# Patient Record
Sex: MALE | NOT HISPANIC OR LATINO
[De-identification: names, ages, dates, MRNs, and addresses within clinical notes are randomized per-mention and may not be internally consistent; named-entity substitution may affect disease eponyms.]

---

## 2020-05-12 ENCOUNTER — FORM ENCOUNTER (OUTPATIENT)
Age: 60
End: 2020-05-12

## 2022-10-25 DIAGNOSIS — Z78.9 OTHER SPECIFIED HEALTH STATUS: ICD-10-CM

## 2022-10-25 DIAGNOSIS — M77.42 METATARSALGIA, LEFT FOOT: ICD-10-CM

## 2022-10-25 DIAGNOSIS — M72.2 PLANTAR FASCIAL FIBROMATOSIS: ICD-10-CM

## 2022-10-25 DIAGNOSIS — S93.492D SPRAIN OF OTHER LIGAMENT OF LEFT ANKLE, SUBSEQUENT ENCOUNTER: ICD-10-CM

## 2022-10-25 DIAGNOSIS — Z86.39 PERSONAL HISTORY OF OTHER ENDOCRINE, NUTRITIONAL AND METABOLIC DISEASE: ICD-10-CM

## 2022-10-25 DIAGNOSIS — M76.72 PERONEAL TENDINITIS, LEFT LEG: ICD-10-CM

## 2022-10-25 DIAGNOSIS — M77.41 METATARSALGIA, RIGHT FOOT: ICD-10-CM

## 2022-10-25 DIAGNOSIS — Z87.891 PERSONAL HISTORY OF NICOTINE DEPENDENCE: ICD-10-CM

## 2022-10-25 DIAGNOSIS — M20.21 HALLUX RIGIDUS, RIGHT FOOT: ICD-10-CM

## 2022-10-25 DIAGNOSIS — M20.22 HALLUX RIGIDUS, LEFT FOOT: ICD-10-CM

## 2022-10-25 DIAGNOSIS — M20.5X2 OTHER DEFORMITIES OF TOE(S) (ACQUIRED), LEFT FOOT: ICD-10-CM

## 2022-10-25 RX ORDER — INSULIN LISPRO 100 [IU]/ML
100 INJECTION, SOLUTION INTRAVENOUS; SUBCUTANEOUS
Qty: 140 | Refills: 0 | Status: ACTIVE | COMMUNITY
Start: 2022-05-09

## 2022-10-25 RX ORDER — ROSUVASTATIN CALCIUM 40 MG/1
40 TABLET, FILM COATED ORAL
Qty: 90 | Refills: 0 | Status: ACTIVE | COMMUNITY
Start: 2022-03-23

## 2022-10-25 RX ORDER — ESOMEPRAZOLE MAGNESIUM 20 MG/1
20 CAPSULE, DELAYED RELEASE ORAL
Refills: 0 | Status: ACTIVE | COMMUNITY

## 2022-10-26 ENCOUNTER — APPOINTMENT (OUTPATIENT)
Dept: PODIATRY | Facility: CLINIC | Age: 62
End: 2022-10-26

## 2022-10-26 VITALS — BODY MASS INDEX: 28.14 KG/M2 | HEIGHT: 69 IN | WEIGHT: 190 LBS

## 2022-10-26 PROCEDURE — 99212 OFFICE O/P EST SF 10 MIN: CPT

## 2022-10-26 RX ORDER — BLOOD-GLUCOSE SENSOR
EACH MISCELLANEOUS
Qty: 9 | Refills: 0 | Status: ACTIVE | COMMUNITY
Start: 2022-08-18

## 2022-10-26 RX ORDER — LINACLOTIDE 145 UG/1
145 CAPSULE, GELATIN COATED ORAL
Qty: 90 | Refills: 0 | Status: ACTIVE | COMMUNITY
Start: 2021-09-13

## 2022-10-26 RX ORDER — PANTOPRAZOLE 40 MG/1
40 TABLET, DELAYED RELEASE ORAL
Qty: 90 | Refills: 0 | Status: ACTIVE | COMMUNITY
Start: 2021-12-07

## 2022-10-26 RX ORDER — DICLOFENAC SODIUM 1% 10 MG/G
1 GEL TOPICAL
Refills: 0 | Status: DISCONTINUED | COMMUNITY
End: 2022-10-26

## 2022-10-28 NOTE — ASSESSMENT
[FreeTextEntry1] : \par Treatment: I trimmed keratotic lesion. I want him to use Aquaphor before exercising. I want him to continue Spenco orthotics and follow-up as needed.

## 2022-10-28 NOTE — HISTORY OF PRESENT ILLNESS
[Sneakers] : jani [FreeTextEntry1] : Patient presents today for high risk footcare.  He has hallux limitus and pinched callus at the hallux. Patient is an insulin dependent diabetic. Fasting blood sugar is 84. Last A1c was 7.3.\par \par

## 2022-10-28 NOTE — PHYSICAL EXAM
[Varicose Veins Of Lower Extremities] : present [0] : left foot posterior tibialis 0 [2+] : left foot dorsalis pedis 2+ [Vibration Dec.] : diminished vibratory sensation at the level of the toes [Position Sense Dec.] : diminished position sense at the level of the toes [Diminished Throughout Right Foot] : diminished sensation with monofilament testing throughout right foot [Diminished Throughout Left Foot] : diminished sensation with monofilament testing throughout left foot [de-identified] : Functional hallux limitus with flatfoot deformity. [FreeTextEntry1] : Pinched callus at the hallux bilateral.

## 2023-01-25 ENCOUNTER — APPOINTMENT (OUTPATIENT)
Dept: PODIATRY | Facility: CLINIC | Age: 63
End: 2023-01-25
Payer: COMMERCIAL

## 2023-01-25 DIAGNOSIS — M79.673 PAIN IN UNSPECIFIED FOOT: ICD-10-CM

## 2023-01-25 PROCEDURE — 99212 OFFICE O/P EST SF 10 MIN: CPT

## 2023-01-27 PROBLEM — M79.673 PAIN, FOOT: Status: ACTIVE | Noted: 2023-01-26

## 2023-01-27 NOTE — HISTORY OF PRESENT ILLNESS
[Sneakers] : jani [FreeTextEntry1] : Patient presents today evaluation of peripheral neuropathy from a flatfoot. He has functional hallux limitus and tailor's bunion that he feels is slowly getting worse.\par

## 2023-01-27 NOTE — ASSESSMENT
[FreeTextEntry1] : \par Impression: Bony's bunion. Pain. Diabetes with neuropathy. \par \par Treatment: He will continue Spenco inserts and arch support. Continue stretching and home exercises. Follow-up in the office with any pain that persists.\par

## 2023-01-27 NOTE — PHYSICAL EXAM
[Varicose Veins Of Lower Extremities] : present [0] : left foot posterior tibialis 0 [2+] : left foot dorsalis pedis 2+ [Vibration Dec.] : diminished vibratory sensation at the level of the toes [Position Sense Dec.] : diminished position sense at the level of the toes [Diminished Throughout Right Foot] : diminished sensation with monofilament testing throughout right foot [Diminished Throughout Left Foot] : diminished sensation with monofilament testing throughout left foot [de-identified] : Flatfoot. Functional hallux limitus and tailor's bunion deformity. [FreeTextEntry1] : Sub 5 IPK's. Pinched callus.

## 2023-04-26 ENCOUNTER — APPOINTMENT (OUTPATIENT)
Dept: PODIATRY | Facility: CLINIC | Age: 63
End: 2023-04-26
Payer: COMMERCIAL

## 2023-04-26 PROCEDURE — 99212 OFFICE O/P EST SF 10 MIN: CPT

## 2023-04-29 ENCOUNTER — NON-APPOINTMENT (OUTPATIENT)
Age: 63
End: 2023-04-29

## 2023-05-03 NOTE — PHYSICAL EXAM
[Varicose Veins Of Lower Extremities] : present [0] : left foot posterior tibialis 0 [2+] : left foot dorsalis pedis 2+ [Vibration Dec.] : diminished vibratory sensation at the level of the toes [Position Sense Dec.] : diminished position sense at the level of the toes [Diminished Throughout Right Foot] : diminished sensation with monofilament testing throughout right foot [Diminished Throughout Left Foot] : diminished sensation with monofilament testing throughout left foot [de-identified] : Adult-acquired flatfoot. Fully compensated forefoot varus. Flexible flatfoot. Functional hallux limitus. Mild hammertoes. Peripheral neuropathy. [FreeTextEntry1] : Venous edema. No signs of phlebitis. Post-inflammatory hyperpigmentation.

## 2023-05-03 NOTE — ASSESSMENT
[FreeTextEntry1] : \par Impression: Flatfoot. Hallux limitus. Diabetic neuropathy.\par \par Treatment:  I want him to go to vascular for evaluation, non-invasive vascular testing based on evaluation. I trimmed areas of keratosis. Discussed shoe gear and stretching. Follow-up in the office for evaluation as needed.

## 2023-05-03 NOTE — HISTORY OF PRESENT ILLNESS
[Sneakers] : jani [FreeTextEntry1] : Patient presents today for diabetic foot care. He has progressing flatfoot with functional hallux limitus. His A1C is 7.4 and fasting sugar this morning was 111.

## 2023-05-31 ENCOUNTER — APPOINTMENT (OUTPATIENT)
Dept: VASCULAR SURGERY | Facility: CLINIC | Age: 63
End: 2023-05-31
Payer: COMMERCIAL

## 2023-05-31 VITALS
HEART RATE: 59 BPM | WEIGHT: 225 LBS | BODY MASS INDEX: 27.4 KG/M2 | HEIGHT: 76 IN | TEMPERATURE: 98 F | SYSTOLIC BLOOD PRESSURE: 116 MMHG | DIASTOLIC BLOOD PRESSURE: 77 MMHG

## 2023-05-31 PROCEDURE — 99203 OFFICE O/P NEW LOW 30 MIN: CPT

## 2023-05-31 PROCEDURE — 93970 EXTREMITY STUDY: CPT

## 2023-05-31 PROCEDURE — 93922 UPR/L XTREMITY ART 2 LEVELS: CPT

## 2023-06-01 NOTE — ASSESSMENT
[FreeTextEntry1] : Problem #1 chronic venous insufficiency of bilateral lower extremities\par - bilateral venous stasis changes present with moderate edema\par - no signs or symptoms of arterial insufficiency\par - will follow up in 3 months with repeat VI studies to evaluate for interval changes\par - advised patient to elevate legs at rest
.

## 2023-06-01 NOTE — DATA REVIEWED
[FreeTextEntry1] : R CRYSTAL 1.08\par L CRYSTAL 1.11\par \par R SFJ reflux\par \par L GSV reflux\par L SFJ 6.8mm

## 2023-06-01 NOTE — PHYSICAL EXAM
[Respiratory Effort] : normal respiratory effort [Normal Rate and Rhythm] : normal rate and rhythm [1+] : left 1+ [2+] : left 2+ [Ankle Swelling (On Exam)] : present [Ankle Swelling Bilaterally] : bilaterally  [Varicose Veins Of Lower Extremities] : bilaterally [Ankle Swelling On The Right] : mild [] : bilaterally [Ankle Swelling On The Left] : moderate [Abdomen Tenderness] : ~T ~M No abdominal tenderness [Skin Ulcer] : no ulcer [Alert] : alert [Oriented to Person] : oriented to person [Oriented to Place] : oriented to place [Oriented to Time] : oriented to time [Calm] : calm [de-identified] : appears stated age [de-identified] : normocephalic, atraumatic [de-identified] : supple

## 2023-06-01 NOTE — HISTORY OF PRESENT ILLNESS
[FreeTextEntry1] : 63 year old male with history of uncontrolled diabetes mellitus who presents as referral by his podiatrist for poor circulation. He denies claudication or rest pain. He does have some peripheral neuropathy. He also has long-standing leg swelling. He denies any history of non-healing wounds. Denies any other problems.

## 2023-08-01 ENCOUNTER — APPOINTMENT (OUTPATIENT)
Dept: PODIATRY | Facility: CLINIC | Age: 63
End: 2023-08-01
Payer: COMMERCIAL

## 2023-08-01 DIAGNOSIS — M21.6X9 OTHER ACQUIRED DEFORMITIES OF UNSPECIFIED FOOT: ICD-10-CM

## 2023-08-01 PROCEDURE — 99212 OFFICE O/P EST SF 10 MIN: CPT

## 2023-08-02 PROBLEM — M21.6X9 OTHER ACQUIRED DEFORMITIES OF UNSPECIFIED FOOT: Status: ACTIVE | Noted: 2023-01-26

## 2023-08-03 NOTE — HISTORY OF PRESENT ILLNESS
[Sneakers] : jani [FreeTextEntry1] : Patient presents today for evaluation and high risk foot care. He has progressing peripheral neuropathy. He has venous insufficiency and is following up with vascular and getting worked up. His A1c is 7.4. Fasting sugar is 136.

## 2023-08-03 NOTE — PHYSICAL EXAM
[Varicose Veins Of Lower Extremities] : present [0] : left foot posterior tibialis 0 [2+] : left foot dorsalis pedis 2+ [Vibration Dec.] : diminished vibratory sensation at the level of the toes [Position Sense Dec.] : diminished position sense at the level of the toes [Diminished Throughout Right Foot] : diminished sensation with monofilament testing throughout right foot [Diminished Throughout Left Foot] : diminished sensation with monofilament testing throughout left foot [de-identified] : Adult-acquired flatfoot. Functional hallux limitus. Tailor's bunion that is progressing with some symptomatology despite having peripheral neuropathy. [FreeTextEntry1] : Sub 5 keratosis with tailor's bunion bursitis.

## 2023-08-03 NOTE — ASSESSMENT
[FreeTextEntry1] :  Impression: Bony's bunion. Diabetic neuropathy. Venous insufficiency. Treatment: I trimmed the keratotic lesions at the 5th metatarsal bilateral. Continue Plastazote orthotics at all times in orthopedic type shoes. I debrided dystrophic nails. I recommended support stockings. Follow-up in the office as needed.

## 2023-08-09 ENCOUNTER — APPOINTMENT (OUTPATIENT)
Dept: VASCULAR SURGERY | Facility: CLINIC | Age: 63
End: 2023-08-09
Payer: COMMERCIAL

## 2023-08-09 VITALS
HEIGHT: 76 IN | WEIGHT: 235 LBS | HEART RATE: 59 BPM | TEMPERATURE: 98.1 F | BODY MASS INDEX: 28.62 KG/M2 | DIASTOLIC BLOOD PRESSURE: 84 MMHG | SYSTOLIC BLOOD PRESSURE: 146 MMHG

## 2023-08-09 DIAGNOSIS — I83.893 VARICOSE VEINS OF BILATERAL LOWER EXTREMITIES WITH OTHER COMPLICATIONS: ICD-10-CM

## 2023-08-09 PROCEDURE — 99213 OFFICE O/P EST LOW 20 MIN: CPT

## 2023-08-09 PROCEDURE — 93970 EXTREMITY STUDY: CPT

## 2023-08-09 NOTE — HISTORY OF PRESENT ILLNESS
[FreeTextEntry1] : 63 year old male with history of uncontrolled diabetes mellitus who presents as referral by his podiatrist for poor circulation. He denies claudication or rest pain. He does have some peripheral neuropathy. He also has long-standing leg swelling. He denies any history of non-healing wounds. Denies any other problems. [de-identified] : Mr. LIANNA PUENTE is a 63-year M who presents for follow-up evaluation of bilateral leg pain for the past several years.  Mr. PUENTE leg discomfort is associated with leg swelling, fatigue, heaviness, achiness, restlessness, muscle cramping, itchiness, dry, flaky skin, and skin darkening at the ankles.  He complains of painful varicose veins. His symptoms have persisted despite conservative management with leg elevation, exercise, attempted weight loss, over-the-counter medications (ibuprofen), and compression stocking use for more than 3 months.  His symptoms are aggravated by weight bearing, prolonged standing, prolonged sitting, extended travel, exercise Nothing helps to alleviate patient's symptoms.  His symptoms interfere with the his ADLs such as work, shopping and housekeeping.  Mr. PUENTE risks factor for venous disease history of varicose veins, spider veins Previous treatment, vein procedures and vein surgeries include:  wearing compression stockings for more than 3 months with little or no relief

## 2023-08-09 NOTE — ASSESSMENT
[FreeTextEntry1] : Mr. LIANNA PUENTE is a 63 year with persistent and worsening bilateral lower extremity venous insufficiency, CEAP classification C3 which is unresponsive to at least 3 months of compression stockings 20-30 mmHg, leg elevation, exercise and over the counter pain medication_(Ibuprofen). Patient has complaints of worsening bilateral leg discomfort with swelling, fatigue, heaviness, achiness, cramping, restlessness, and painful varicosities. The patients symptoms interfere with their ADLs, such as walking, shopping and house work, and their ability to work and exercise. Patient has intact pulses in both legs without evidence of arterial insufficiency.  Treatment is indicated not for cosmetic reasons but for symptomatic venous reflux disease with symptoms which is refractory to conservative therapy. Venous duplex study demonstrates bilateral lower extremity venous insufficiency. The need for definitive effective treatment is based on severe, interfering and worsening reflux symptoms with evidence of venous insufficiency on venous ultrasound.  Patient is a candidate for endovenous ablation treatment of the bilateral GSV.  The risks and benefits of endovenous ablation treatment versus continued conservative management were discussed with the patient. Patient chooses endovenous ablation treatments. Treatment plan to be scheduled.

## 2023-08-09 NOTE — PHYSICAL EXAM
[Respiratory Effort] : normal respiratory effort [Normal Rate and Rhythm] : normal rate and rhythm [1+] : left 1+ [2+] : left 2+ [Ankle Swelling (On Exam)] : present [Ankle Swelling Bilaterally] : bilaterally  [Varicose Veins Of Lower Extremities] : bilaterally [Ankle Swelling On The Right] : mild [] : bilaterally [Ankle Swelling On The Left] : moderate [Abdomen Tenderness] : ~T ~M No abdominal tenderness [Skin Ulcer] : no ulcer [Alert] : alert [Oriented to Person] : oriented to person [Oriented to Place] : oriented to place [Oriented to Time] : oriented to time [Calm] : calm [de-identified] : appears stated age [de-identified] : normocephalic, atraumatic [de-identified] : supple [FreeTextEntry1] : LE vein findings:  Varicosities (spider, reticular, varicose) bilateral  Edema  bilateral / Skin changes  dry, flaky skin, stasis dermatitis, hyperpigmentation in the gator area, lipodermatosclerosis,  Ulcer none CEAP classification C3 Palpable DP pulses bilaterally

## 2023-09-18 ENCOUNTER — TRANSCRIPTION ENCOUNTER (OUTPATIENT)
Age: 63
End: 2023-09-18

## 2023-09-18 ENCOUNTER — APPOINTMENT (OUTPATIENT)
Dept: VASCULAR SURGERY | Facility: CLINIC | Age: 63
End: 2023-09-18
Payer: COMMERCIAL

## 2023-09-18 PROCEDURE — 36475 ENDOVENOUS RF 1ST VEIN: CPT | Mod: LT

## 2023-09-18 RX ORDER — SODIUM BICARBONATE 84 MG/ML
8.4 INJECTION, SOLUTION INTRAVENOUS
Qty: 5 | Refills: 0 | Status: COMPLETED | COMMUNITY
Start: 2023-09-14 | End: 2023-09-18

## 2023-09-18 RX ORDER — ALPRAZOLAM 0.5 MG/1
0.5 TABLET ORAL
Qty: 1 | Refills: 0 | Status: COMPLETED | COMMUNITY
Start: 2023-09-14 | End: 2023-09-18

## 2023-09-18 RX ORDER — EZETIMIBE 10 MG/1
TABLET ORAL
Refills: 0 | Status: COMPLETED | COMMUNITY
End: 2023-09-18

## 2023-09-18 RX ORDER — DAPAGLIFLOZIN 10 MG/1
10 TABLET, FILM COATED ORAL
Qty: 90 | Refills: 0 | Status: COMPLETED | COMMUNITY
Start: 2022-09-07 | End: 2023-09-18

## 2023-09-18 RX ORDER — LIDOCAINE HYDROCHLORIDE 10 MG/ML
1 INJECTION, SOLUTION INFILTRATION; PERINEURAL
Qty: 50 | Refills: 0 | Status: COMPLETED | COMMUNITY
Start: 2023-09-14 | End: 2023-09-18

## 2023-09-25 ENCOUNTER — APPOINTMENT (OUTPATIENT)
Dept: VASCULAR SURGERY | Facility: CLINIC | Age: 63
End: 2023-09-25
Payer: COMMERCIAL

## 2023-09-25 PROCEDURE — 93971 EXTREMITY STUDY: CPT | Mod: LT

## 2023-10-25 ENCOUNTER — APPOINTMENT (OUTPATIENT)
Dept: VASCULAR SURGERY | Facility: CLINIC | Age: 63
End: 2023-10-25

## 2023-11-01 ENCOUNTER — APPOINTMENT (OUTPATIENT)
Dept: PODIATRY | Facility: CLINIC | Age: 63
End: 2023-11-01
Payer: COMMERCIAL

## 2023-11-01 ENCOUNTER — APPOINTMENT (OUTPATIENT)
Dept: VASCULAR SURGERY | Facility: CLINIC | Age: 63
End: 2023-11-01
Payer: COMMERCIAL

## 2023-11-01 VITALS
TEMPERATURE: 98 F | DIASTOLIC BLOOD PRESSURE: 81 MMHG | WEIGHT: 235 LBS | SYSTOLIC BLOOD PRESSURE: 123 MMHG | HEIGHT: 76 IN | HEART RATE: 77 BPM | BODY MASS INDEX: 28.62 KG/M2

## 2023-11-01 DIAGNOSIS — L60.0 INGROWING NAIL: ICD-10-CM

## 2023-11-01 DIAGNOSIS — I87.2 VENOUS INSUFFICIENCY (CHRONIC) (PERIPHERAL): ICD-10-CM

## 2023-11-01 DIAGNOSIS — B35.3 TINEA PEDIS: ICD-10-CM

## 2023-11-01 PROCEDURE — 99213 OFFICE O/P EST LOW 20 MIN: CPT

## 2023-11-01 RX ORDER — CLOTRIMAZOLE AND BETAMETHASONE DIPROPIONATE 10; .5 MG/G; MG/G
1-0.05 CREAM TOPICAL
Qty: 1 | Refills: 1 | Status: ACTIVE | COMMUNITY
Start: 2023-11-01 | End: 1900-01-01

## 2023-11-02 PROBLEM — L60.0 INGROWN NAIL: Status: ACTIVE | Noted: 2022-10-25

## 2023-11-03 PROBLEM — B35.3 TINEA PEDIS: Status: ACTIVE | Noted: 2023-11-02

## 2023-11-07 PROBLEM — I87.2 VENOUS INSUFFICIENCY: Status: ACTIVE | Noted: 2023-08-02

## 2023-11-30 ENCOUNTER — APPOINTMENT (OUTPATIENT)
Dept: VASCULAR SURGERY | Facility: CLINIC | Age: 63
End: 2023-11-30
Payer: COMMERCIAL

## 2023-11-30 PROCEDURE — 36475 ENDOVENOUS RF 1ST VEIN: CPT | Mod: RT

## 2023-11-30 RX ORDER — ALPRAZOLAM 0.5 MG/1
0.5 TABLET ORAL
Qty: 1 | Refills: 0 | Status: COMPLETED | COMMUNITY
Start: 2023-11-20 | End: 2023-11-30

## 2023-11-30 RX ORDER — SODIUM BICARBONATE 84 MG/ML
8.4 INJECTION, SOLUTION INTRAVENOUS
Qty: 5 | Refills: 0 | Status: COMPLETED | COMMUNITY
Start: 2023-11-20 | End: 2023-11-30

## 2023-11-30 RX ORDER — KRILL/OM-3/DHA/EPA/PHOSPHO/AST 1000-230MG
81 CAPSULE ORAL
Refills: 0 | Status: ACTIVE | COMMUNITY
Start: 2023-11-30

## 2023-11-30 RX ORDER — LIDOCAINE HYDROCHLORIDE 10 MG/ML
1 INJECTION, SOLUTION INFILTRATION; PERINEURAL
Qty: 50 | Refills: 0 | Status: COMPLETED | COMMUNITY
Start: 2023-11-20 | End: 2023-11-30

## 2023-12-06 ENCOUNTER — APPOINTMENT (OUTPATIENT)
Dept: VASCULAR SURGERY | Facility: CLINIC | Age: 63
End: 2023-12-06
Payer: COMMERCIAL

## 2023-12-06 PROCEDURE — 93971 EXTREMITY STUDY: CPT | Mod: RT

## 2023-12-27 ENCOUNTER — APPOINTMENT (OUTPATIENT)
Dept: VASCULAR SURGERY | Facility: CLINIC | Age: 63
End: 2023-12-27

## 2024-02-07 ENCOUNTER — APPOINTMENT (OUTPATIENT)
Dept: PODIATRY | Facility: CLINIC | Age: 64
End: 2024-02-07
Payer: COMMERCIAL

## 2024-02-07 DIAGNOSIS — L60.3 NAIL DYSTROPHY: ICD-10-CM

## 2024-02-07 PROCEDURE — 99212 OFFICE O/P EST SF 10 MIN: CPT

## 2024-02-09 PROBLEM — L60.3 NAIL DYSTROPHY: Status: ACTIVE | Noted: 2024-02-08

## 2024-02-09 NOTE — HISTORY OF PRESENT ILLNESS
[FreeTextEntry1] : Patient presents today because of hallux limitus and diabetic foot care. He has peripheral neuropathy that is unchanged. He has functional hallux limitus.

## 2024-02-09 NOTE — ASSESSMENT
[FreeTextEntry1] : Impression: Diabetes with neuropathy. Dystrophic nails. Hallux limitus.  Treatment: I discussed shoe gear and arch supports. I discussed stretching exercises. I debrided dystrophic nails. Follow-up in the office as needed.

## 2024-02-09 NOTE — PHYSICAL EXAM
[Varicose Veins Of Lower Extremities] : present [Delayed in the Right Toes] : capillary refills delayed in the right toes [Delayed in the Left Toes] : capillary refills delayed in the left toes [0] : left foot posterior tibialis 0 [2+] : left foot dorsalis pedis 2+ [Vibration Dec.] : diminished vibratory sensation at the level of the toes [Position Sense Dec.] : diminished position sense at the level of the toes [Diminished Throughout Right Foot] : diminished sensation with monofilament testing throughout right foot [Diminished Throughout Left Foot] : diminished sensation with monofilament testing throughout left foot [de-identified] : Functional hallux limitus, minimal symptomatology.  [FreeTextEntry1] : Elongated, dystrophic nails bilateral.

## 2024-05-08 ENCOUNTER — APPOINTMENT (OUTPATIENT)
Dept: PODIATRY | Facility: CLINIC | Age: 64
End: 2024-05-08
Payer: MEDICARE

## 2024-05-08 DIAGNOSIS — M20.5X9 OTHER DEFORMITIES OF TOE(S) (ACQUIRED), UNSPECIFIED FOOT: ICD-10-CM

## 2024-05-08 DIAGNOSIS — E11.49 TYPE 2 DIABETES MELLITUS WITH OTHER DIABETIC NEUROLOGICAL COMPLICATION: ICD-10-CM

## 2024-05-08 PROCEDURE — 99212 OFFICE O/P EST SF 10 MIN: CPT

## 2024-05-09 PROBLEM — M20.5X9 HALLUX LIMITUS: Status: ACTIVE | Noted: 2022-10-27

## 2024-05-09 PROBLEM — E11.49 DM (DIABETES MELLITUS), TYPE 2 WITH NEUROLOGICAL COMPLICATIONS: Status: ACTIVE | Noted: 2022-10-27

## 2024-05-10 NOTE — PHYSICAL EXAM
[Varicose Veins Of Lower Extremities] : present [Delayed in the Right Toes] : capillary refills delayed in the right toes [Delayed in the Left Toes] : capillary refills delayed in the left toes [0] : left foot posterior tibialis 0 [2+] : left foot dorsalis pedis 2+ [Vibration Dec.] : diminished vibratory sensation at the level of the toes [Position Sense Dec.] : diminished position sense at the level of the toes [Diminished Throughout Right Foot] : diminished sensation with monofilament testing throughout right foot [Diminished Throughout Left Foot] : diminished sensation with monofilament testing throughout left foot [de-identified] : Functional hallux limitus, minimal symptomatology.  [FreeTextEntry1] : Elongated, dystrophic nails bilateral. Pinched callus at the hallux. Sub 5 keratosis. On the left side he has an unchanged skin lesion that appears benign. I am recommending he goes to dermatologist for evaluation and biopsy.

## 2024-05-10 NOTE — ASSESSMENT
[FreeTextEntry1] : Impression: Diabetes with neuropathy.  Hallux limitus.  Treatment: Area was prepped and I trimmed the keratosis and dystrophic nails without incidence. I discussed stretching and home exercises s well as specific shoe gear. she will follow-up in the office for evaluation as needed.

## 2024-05-10 NOTE — HISTORY OF PRESENT ILLNESS
[FreeTextEntry1] : Patient presents today for diabetic foot care. Fasting sugar is 117. He has peripheral neuropathy. He has hallux limitus.

## 2024-08-07 ENCOUNTER — APPOINTMENT (OUTPATIENT)
Dept: PODIATRY | Facility: CLINIC | Age: 64
End: 2024-08-07

## 2024-08-07 PROCEDURE — 99212 OFFICE O/P EST SF 10 MIN: CPT

## 2024-08-12 NOTE — PHYSICAL EXAM
[Varicose Veins Of Lower Extremities] : present [Delayed in the Right Toes] : capillary refills delayed in the right toes [Delayed in the Left Toes] : capillary refills delayed in the left toes [0] : left foot posterior tibialis 0 [2+] : left foot dorsalis pedis 2+ [Vibration Dec.] : diminished vibratory sensation at the level of the toes [Position Sense Dec.] : diminished position sense at the level of the toes [Diminished Throughout Right Foot] : diminished sensation with monofilament testing throughout right foot [Diminished Throughout Left Foot] : diminished sensation with monofilament testing throughout left foot [de-identified] : Functional hallux limitus, minimal symptomatology.  [FreeTextEntry1] : Elongated, dystrophic nails bilateral. Pinched callus at the hallux. Sub 5 keratosis. On the left side he has an unchanged skin lesion that appears benign.

## 2024-08-12 NOTE — PHYSICAL EXAM
[Varicose Veins Of Lower Extremities] : present [Delayed in the Right Toes] : capillary refills delayed in the right toes [Delayed in the Left Toes] : capillary refills delayed in the left toes [0] : left foot posterior tibialis 0 [2+] : left foot dorsalis pedis 2+ [Vibration Dec.] : diminished vibratory sensation at the level of the toes [Position Sense Dec.] : diminished position sense at the level of the toes [Diminished Throughout Right Foot] : diminished sensation with monofilament testing throughout right foot [Diminished Throughout Left Foot] : diminished sensation with monofilament testing throughout left foot [de-identified] : Functional hallux limitus, minimal symptomatology.  [FreeTextEntry1] : Elongated, dystrophic nails bilateral. Pinched callus at the hallux. Sub 5 keratosis. On the left side he has an unchanged skin lesion that appears benign.

## 2024-08-12 NOTE — PHYSICAL EXAM
[Varicose Veins Of Lower Extremities] : present [Delayed in the Right Toes] : capillary refills delayed in the right toes [Delayed in the Left Toes] : capillary refills delayed in the left toes [0] : left foot posterior tibialis 0 [2+] : left foot dorsalis pedis 2+ [Vibration Dec.] : diminished vibratory sensation at the level of the toes [Position Sense Dec.] : diminished position sense at the level of the toes [Diminished Throughout Right Foot] : diminished sensation with monofilament testing throughout right foot [Diminished Throughout Left Foot] : diminished sensation with monofilament testing throughout left foot [de-identified] : Functional hallux limitus, minimal symptomatology.  [FreeTextEntry1] : Elongated, dystrophic nails bilateral. Pinched callus at the hallux. Sub 5 keratosis. On the left side he has an unchanged skin lesion that appears benign.

## 2024-08-12 NOTE — HISTORY OF PRESENT ILLNESS
[FreeTextEntry1] : Patient presents today because of severe flatfoot with functional hallux limitus and pinched callus as a result. He went to his dermatologist for a lesion on the bottom of the left foot, which is not dangerous as per his dermatologist.  his A1c is 7.2. Fasting sugar is 87. The patient's history and physical has been reviewed and verified with no changes.

## 2024-08-12 NOTE — ASSESSMENT
[FreeTextEntry1] : Impression: Diabetes with neuropathy.  Hallux limitus.  Treatment: Area was prepped, and I trimmed the keratosis and dystrophic nails without incidence. I gave him tube foam and I recommended using Aquaphor. Discussed Spenco inserts. Follow-up in the office as needed.

## 2024-10-11 ENCOUNTER — NON-APPOINTMENT (OUTPATIENT)
Age: 64
End: 2024-10-11

## 2024-10-16 ENCOUNTER — APPOINTMENT (OUTPATIENT)
Dept: PODIATRY | Facility: CLINIC | Age: 64
End: 2024-10-16

## 2024-10-16 DIAGNOSIS — E11.49 TYPE 2 DIABETES MELLITUS WITH OTHER DIABETIC NEUROLOGICAL COMPLICATION: ICD-10-CM

## 2024-10-16 DIAGNOSIS — L85.1 ACQUIRED KERATOSIS [KERATODERMA] PALMARIS ET PLANTARIS: ICD-10-CM

## 2024-10-16 DIAGNOSIS — M21.40 FLAT FOOT [PES PLANUS] (ACQUIRED), UNSPECIFIED FOOT: ICD-10-CM

## 2024-10-16 PROCEDURE — 99212 OFFICE O/P EST SF 10 MIN: CPT

## 2024-10-22 PROBLEM — L85.1 KERATODERMA, ACQUIRED: Status: ACTIVE | Noted: 2024-10-21

## 2024-10-22 PROBLEM — M21.40 FLATFOOT: Status: ACTIVE | Noted: 2024-10-21

## 2025-01-16 ENCOUNTER — APPOINTMENT (OUTPATIENT)
Dept: PODIATRY | Facility: CLINIC | Age: 65
End: 2025-01-16

## 2025-01-16 DIAGNOSIS — M20.5X9 OTHER DEFORMITIES OF TOE(S) (ACQUIRED), UNSPECIFIED FOOT: ICD-10-CM

## 2025-01-16 DIAGNOSIS — E11.49 TYPE 2 DIABETES MELLITUS WITH OTHER DIABETIC NEUROLOGICAL COMPLICATION: ICD-10-CM

## 2025-01-16 PROCEDURE — 99212 OFFICE O/P EST SF 10 MIN: CPT

## 2025-03-22 ENCOUNTER — NON-APPOINTMENT (OUTPATIENT)
Age: 65
End: 2025-03-22

## 2025-04-25 ENCOUNTER — APPOINTMENT (OUTPATIENT)
Dept: PODIATRY | Facility: CLINIC | Age: 65
End: 2025-04-25

## 2025-04-25 DIAGNOSIS — I83.10 VARICOSE VEINS OF UNSPECIFIED LOWER EXTREMITY WITH INFLAMMATION: ICD-10-CM

## 2025-04-25 DIAGNOSIS — E11.49 TYPE 2 DIABETES MELLITUS WITH OTHER DIABETIC NEUROLOGICAL COMPLICATION: ICD-10-CM

## 2025-04-25 DIAGNOSIS — M20.5X9 OTHER DEFORMITIES OF TOE(S) (ACQUIRED), UNSPECIFIED FOOT: ICD-10-CM

## 2025-04-25 PROCEDURE — 99212 OFFICE O/P EST SF 10 MIN: CPT

## 2025-05-01 PROBLEM — I83.10 VARICOSE VEINS WITH INFLAMMATION: Status: ACTIVE | Noted: 2025-04-29

## 2025-07-26 ENCOUNTER — NON-APPOINTMENT (OUTPATIENT)
Age: 65
End: 2025-07-26

## 2025-07-28 ENCOUNTER — APPOINTMENT (OUTPATIENT)
Dept: PODIATRY | Facility: CLINIC | Age: 65
End: 2025-07-28
Payer: MEDICARE

## 2025-07-28 DIAGNOSIS — M79.673 PAIN IN UNSPECIFIED FOOT: ICD-10-CM

## 2025-07-28 DIAGNOSIS — L85.1 ACQUIRED KERATOSIS [KERATODERMA] PALMARIS ET PLANTARIS: ICD-10-CM

## 2025-07-28 DIAGNOSIS — L60.3 NAIL DYSTROPHY: ICD-10-CM

## 2025-07-28 DIAGNOSIS — E11.49 TYPE 2 DIABETES MELLITUS WITH OTHER DIABETIC NEUROLOGICAL COMPLICATION: ICD-10-CM

## 2025-07-28 PROCEDURE — 11056 PARNG/CUTG B9 HYPRKR LES 2-4: CPT | Mod: Q9

## 2025-07-28 PROCEDURE — 11719 TRIM NAIL(S) ANY NUMBER: CPT | Mod: Q9,59
